# Patient Record
Sex: MALE | Race: WHITE | URBAN - METROPOLITAN AREA
[De-identification: names, ages, dates, MRNs, and addresses within clinical notes are randomized per-mention and may not be internally consistent; named-entity substitution may affect disease eponyms.]

---

## 2019-06-24 ENCOUNTER — HISTORICAL (OUTPATIENT)
Dept: ADMINISTRATIVE | Facility: HOSPITAL | Age: 69
End: 2019-06-24

## 2019-08-26 ENCOUNTER — HISTORICAL (OUTPATIENT)
Dept: ADMINISTRATIVE | Facility: HOSPITAL | Age: 69
End: 2019-08-26

## 2022-04-30 NOTE — OP NOTE
"   Patient:   Ulysses Horton III            MRN: [id number]            FIN: [id number]               Age:   68 years     Sex:  Male     :  1950   Associated Diagnoses:   None   Author:   Tran VASQUEZ, Glenda FLORES      NAME: Ulysses Horton III  SURGEON:  Glenda Mayfield MD    YOB: 1950  DATE OF SURGERY: 19    PREOPERATIVE DIAGNOSIS:  Cataract, left eye.    POSTOPERATIVE DIAGNOSIS:  Cataract, left eye.    PROCEDURE:  Cataract extraction with intraocular lens placement, left eye.    HISTORY:  The patient is a 68-year-old male, who presented to the clinic with a 2+ nuclear sclerotic cataract causing difficulty in patient's activities of daily living. After thorough discussion of risks, benefits, alternatives and complications, the patient expressed understanding and wished to proceed with cataract extraction.  Goal of distance verified at bedside.  He has decided to proceed wtih FLACS with LRI, monofocal distance IOL.  Patient has a large macular scar with atrophy from AMD.  He understands poor visual potential, but really wants to proceed with cataract extraction to 'improve whatever I have to the best of my ability, even if it's not much better or any better than now."  He also insists on LRI to try to optimize results.    PROCEDURE IN DETAIL:  On the day of surgery patient was brought to the preoperative holding area, where patient was given five drops each of phenylephrine, tropicamide and Cyclopentolate into the operative eye.  Prolensa was placed in operative eye.   Patient was then brought to the laser room, where toric markers were used to joe her eye.  Catalys laser was then used to create capsulotomy and fragment the lens, create LRI..  Neosynephrine 10% was placed into eye after laser was completed.  She was brought to the operating room where patient was given Marcaine drops into the operative eye.  Patient was prepped and draped in normal sterile fashion.  A lid speculum " was inserted.  Using operating microscope, 1.0-mm keratome was used to create a side port wound through which 1% epishugarcaine was injected, followed by Viscoat.  A 2.65 mm keratome was used to create triplanar phacoemulsification wound, through which continuous tear capsulorrhexis was performed using Utrata forceps. Hydrodissection and delineation were performed until lens was rotating freely in capsular bag.  Phacoemulsification was carried out in divide and conquer fashion to remove nuclear and epinuclear fragments.  I/A was used to remove cortex carefully.  Healon was injected into the capsular bag, which was found to be free of tears or defect.  A ZCBOO +18.0 diopter lens was inserted and carefully rotated into place.  Viscoelastic was removed from the eye.  Wounds were sealed using hydration.  Lens was in excellent position at end of case.  LRI was opened with sinskey.  Vigamox  and Maxitrol ointment was placed into the patient's eye, which was patched and shielded.  The patient tolerated the procedure well and will followup tomorrow in clinic.

## 2022-04-30 NOTE — OP NOTE
Patient:   Ulysses Horton III            MRN: [id number]            FIN: [id number]               Age:   68 years     Sex:  Male     :  1950   Associated Diagnoses:   None   Author:   Tran VASQUEZ, Glenda FLORES      NAME: Ulysses Horton III  SURGEON:  Glenda Mayfield MD    YOB: 1950  DATE OF SURGERY: 2019    PREOPERATIVE DIAGNOSIS:  Cataract, right eye.    POSTOPERATIVE DIAGNOSIS:  Cataract, right eye.    PROCEDURE:  Cataract extraction with intraocular lens placement, right eye.    HISTORY:  The patient is a 68-year-old male, who presented to the clinic with a 2+ nuclear sclerotic cataract causing difficulty in patient's activities of daily living. After thorough discussion of risks, benefits, alternatives and complications, the patient expressed understanding and wished to proceed with cataract extraction.  Goal of distance verified at bedside with monofocal IOL.  BSS plus and Omidria will be used due to high risk of IFIS.    PROCEDURE IN DETAIL:  On the day of surgery patient was brought to the preoperative holding area, where patient was given five drops each of phenylephrine, tropicamide and Cyclopentolate into the operative eye.  Patient was then brought to the Catalys laser suite, where capsulotomy and fragementation were performed using laser.  Pateient was then brought to operating room where patient was given Marcaine drops into the operative eye.  Patient was prepped and draped in normal sterile fashion.  A lid speculum was inserted. Using operating microscope, 1.0-mm keratome was used to create a side port wound through which 1% epishugarcaine was injected, followed by air then Viscoat.  A 2.65 mm keratome was used to create triplanar phacoemulsification wound, through which continuous tear capsulorrhexis was removed withUtrata forceps.  Care was taken that capsulotomy was free.  Hydrodissection and delineation were performed until lens was rotating freely in capsular  bag.  Phacoemulsification was carried out in divide and conquer fashion to remove nuclear and epinuclear fragments.  I/A was used to remove cortex carefully.  Healon was injected into the capsular bag, which was found to be free of tears or defect.  A ZCBOO +19.0 diopter lens was inserted and carefully rotated into place.  Viscoelastic was removed from the eye.  Wounds were sealed using hydration. Lens was in excellent position at end of case.  Vigamox  and Maxitrol ointment was placed into the patient's eye, which was patched and shielded.  The patient tolerated the procedure well and will followup tomorrow in clinic.